# Patient Record
Sex: FEMALE | Race: WHITE | Employment: UNEMPLOYED | URBAN - METROPOLITAN AREA
[De-identification: names, ages, dates, MRNs, and addresses within clinical notes are randomized per-mention and may not be internally consistent; named-entity substitution may affect disease eponyms.]

---

## 2019-01-28 ENCOUNTER — HOSPITAL ENCOUNTER (INPATIENT)
Age: 29
LOS: 7 days | Discharge: HOME | DRG: 776 | End: 2019-02-04
Attending: PSYCHIATRY & NEUROLOGY | Admitting: PSYCHIATRY & NEUROLOGY
Payer: COMMERCIAL

## 2019-01-28 ENCOUNTER — HOSPITAL ENCOUNTER (EMERGENCY)
Age: 29
Discharge: STILL ADMITTED OR ADMINISTRATIVE | End: 2019-01-28
Attending: EMERGENCY MEDICINE
Payer: SELF-PAY

## 2019-01-28 PROCEDURE — 75810000275 HC EMERGENCY DEPT VISIT NO LEVEL OF CARE

## 2019-01-28 PROCEDURE — 74011250637 HC RX REV CODE- 250/637: Performed by: PSYCHIATRY & NEUROLOGY

## 2019-01-28 PROCEDURE — 65220000003 HC RM SEMIPRIVATE PSYCH

## 2019-01-28 RX ORDER — ESCITALOPRAM OXALATE 10 MG/1
20 TABLET ORAL DAILY
Status: DISCONTINUED | OUTPATIENT
Start: 2019-01-29 | End: 2019-02-04 | Stop reason: HOSPADM

## 2019-01-28 RX ORDER — IBUPROFEN 600 MG/1
600 TABLET ORAL
Status: DISCONTINUED | OUTPATIENT
Start: 2019-01-28 | End: 2019-02-04 | Stop reason: HOSPADM

## 2019-01-28 RX ORDER — HYDROXYZINE PAMOATE 50 MG/1
50 CAPSULE ORAL
Status: DISCONTINUED | OUTPATIENT
Start: 2019-01-28 | End: 2019-02-04 | Stop reason: HOSPADM

## 2019-01-28 RX ORDER — HALOPERIDOL 5 MG/1
5 TABLET ORAL
Status: DISCONTINUED | OUTPATIENT
Start: 2019-01-28 | End: 2019-02-01

## 2019-01-28 RX ORDER — HALOPERIDOL 5 MG/ML
5 INJECTION INTRAMUSCULAR
Status: DISCONTINUED | OUTPATIENT
Start: 2019-01-28 | End: 2019-02-01

## 2019-01-28 RX ORDER — FERROUS SULFATE, DRIED 160(50) MG
1 TABLET, EXTENDED RELEASE ORAL
Status: DISCONTINUED | OUTPATIENT
Start: 2019-01-29 | End: 2019-02-04 | Stop reason: HOSPADM

## 2019-01-28 RX ORDER — DOCUSATE SODIUM 100 MG/1
100 CAPSULE, LIQUID FILLED ORAL DAILY
Status: DISCONTINUED | OUTPATIENT
Start: 2019-01-29 | End: 2019-02-04 | Stop reason: HOSPADM

## 2019-01-28 RX ORDER — LANOLIN ALCOHOL/MO/W.PET/CERES
1 CREAM (GRAM) TOPICAL
Status: DISCONTINUED | OUTPATIENT
Start: 2019-01-29 | End: 2019-02-04 | Stop reason: HOSPADM

## 2019-01-28 RX ADMIN — HYDROXYZINE PAMOATE 50 MG: 50 CAPSULE ORAL at 19:19

## 2019-01-29 PROBLEM — F29 PSYCHOSIS (HCC): Status: ACTIVE | Noted: 2019-01-29

## 2019-01-29 PROCEDURE — 74011250637 HC RX REV CODE- 250/637: Performed by: PSYCHIATRY & NEUROLOGY

## 2019-01-29 PROCEDURE — 65220000003 HC RM SEMIPRIVATE PSYCH

## 2019-01-29 RX ORDER — OXYCODONE AND ACETAMINOPHEN 5; 325 MG/1; MG/1
1 TABLET ORAL 2 TIMES DAILY
Status: DISCONTINUED | OUTPATIENT
Start: 2019-01-29 | End: 2019-02-04 | Stop reason: HOSPADM

## 2019-01-29 RX ADMIN — HYDROXYZINE PAMOATE 50 MG: 50 CAPSULE ORAL at 02:35

## 2019-01-29 RX ADMIN — IBUPROFEN 600 MG: 600 TABLET ORAL at 17:57

## 2019-01-29 RX ADMIN — IBUPROFEN 600 MG: 600 TABLET ORAL at 08:50

## 2019-01-29 RX ADMIN — ESCITALOPRAM OXALATE 20 MG: 10 TABLET ORAL at 08:50

## 2019-01-29 RX ADMIN — FERROUS SULFATE TAB 325 MG (65 MG ELEMENTAL FE) 325 MG: 325 (65 FE) TAB at 08:49

## 2019-01-29 RX ADMIN — LURASIDONE HYDROCHLORIDE 60 MG: 40 TABLET, FILM COATED ORAL at 08:49

## 2019-01-29 RX ADMIN — HYDROXYZINE PAMOATE 50 MG: 50 CAPSULE ORAL at 23:51

## 2019-01-29 RX ADMIN — CALCIUM CARBONATE 500 MG (1,250 MG)-VITAMIN D3 200 UNIT TABLET 1 TABLET: at 08:49

## 2019-01-29 RX ADMIN — HYDROXYZINE PAMOATE 50 MG: 50 CAPSULE ORAL at 12:57

## 2019-01-29 RX ADMIN — PRENATAL VITAMINS-IRON FUMARATE 27 MG IRON-FOLIC ACID 0.8 MG TABLET 1 TABLET: at 08:50

## 2019-01-29 RX ADMIN — DOCUSATE SODIUM 100 MG: 100 CAPSULE, LIQUID FILLED ORAL at 08:50

## 2019-01-29 RX ADMIN — OXYCODONE HYDROCHLORIDE AND ACETAMINOPHEN 1 TABLET: 5; 325 TABLET ORAL at 20:20

## 2019-01-29 NOTE — BH NOTES
Patient  came to visit and brought in patients  licenses and insurance cards. Cards are in patients chart in envelope

## 2019-01-29 NOTE — BH NOTES
ERIKA Note:-S-\"I am just trying to hold on:!!-O-The above pt has been quiet but pleasant upon approach the entire shift. She has appeared very catatonic the entire shift. She has required several prompts when speaking to the above pt during a conversation. She has not experienced any falls this shift. She required several prompts to perform her rojelio-care during this shift. She has not been a management problem this shift. She has not been on the phone talking to family and friends the entire shift. She has participated in scheduled groups however when not in group she was int he bed laying down thereabout the shift. She has not experienced any falls this shift. -A-Problem #1 cont. -P-Maintain a safe and supportive enviorment.

## 2019-01-29 NOTE — PROGRESS NOTES
Problem: Depressed Mood (Adult/Pediatric) Goal: *STG: Remains safe in hospital 
Patient will remain free of harm to self and others every shift throughout hospitalization. Outcome: Progressing Towards Goal 
Pt has not engaged in any self injurious behaviors Goal: *STG: Complies with medication therapy Patient will take medication as prescribed every shift throughout hospital stay. Outcome: Progressing Towards Goal 
Pt compliant with prescribed medications Comments: Pt has been in day area. Pt has minimal interactions with peers. Pt has attended groups with participation. Pt denies current SI and AVH. Pt compliant with meals and meds. Rounds maintained Q 15 mins. Staff will continue to offer a safe and supportive environment

## 2019-01-29 NOTE — H&P
[address] [place] [address]  
[facility] ADMIT NOTE Rory Landau 
MR#: [id number] : 1990 ACCOUNT #: [de-identified] ADMIT DATE: 2019 IDENTIFYING DATA:  Patient is a 39-year-old  white female resident of [place], [place]. She is covered by [employer] [facility] [employer]. BASIS FOR ADMISSION:  The patient was admitted in referral to us after her outpatient nurse practitioner called to try to get her into the hospital.  She had been at [facility] Room on the prior evening in a confused and disorganized condition being 1 week postpartum. They had tried to get her into facilities in the local area and there were no facilities available anywhere in the [facility] at the time. She was continuing to be described as confused and disorganized, being unable to care for self. The patient reports that she had been seen by Rosalio Gill, nurse practitioner at [facility] (ROMAN HUFFMAN) since she had first been diagnosed as pregnant 9 and a half months earlier. She described difficulties with focus and confusion. She had been depressed. The patient had been placed on venlafaxine XR and had been on clonazepam 0.5 mg twice a day as needed. Patient was switched over to Lexapro 10 mg daily, which seemed to have been helping somewhat. The patient described a prior history of substance addiction including opiates and alprazolam.  She had first been placed on methadone being up to 120 mg daily for a year. She had been switched over to Suboxone, unknown dosing and then when pregnant was switched over to Subutex. She was on 2 mg daily. We were unaware of this that she was on the Subutex when case was presented and she was accepted for admission. We had not been given that information. On presentation, the patient does give history of having been doing fairly well until several months before delivery. She slowly got more depressed. She was significantly more depressed after the birth of the child. She was having episodes of confusion and disorganization, screaming to go into adjacent rooms, believing that her child was there when she was not in the other room. She would get aggressive and agitated. She was confused and disorganized. She would hear the sound of her child screaming in the other room. Currently, she denies auditory hallucinations, but does continue to break out in tears rapidly and without reason. She endorsed paranoid and persecutory ideas. She had been continuing to take her medicines. Most recently, she had been switched on to Latuda 60 mg in the morning and increased Lexapro 20 mg daily. She said she had been getting her Subutex 2 mg daily from [facility] in [place], [place]. MEDICAL HISTORY:  Significant for her front teeth having significant dental decay as a result of her substance abuse history. She describes a history of chronic pain in various parts of the body. Could not give the exact place. She described chronic low back pain, having been on painkillers for this. She did have an IUD placed after her delivery. ALLERGIES:  SHE IS ALLERGIC TO AUGMENTIN AND CIPRO. REVIEW OF SYSTEMS:  Did show that she had complaints of low back complaints and denied other physical health symptoms. Most recent physical examination had been normal other than her postpartum condition. She had been denying any other symptoms and did not exhibit any abnormalities including not exhibiting neurologic abnormalities. She did describe fairly normal vaginal bleeding postpartum. SUBSTANCE ABUSE HISTORY:  She said that she has been taking the clonazepam about 1 per day. She has tried CBD oil but said that this was not helpful. She denied alcohol or drug abuse. FAMILY HISTORY:  Significant for maternal grandfather with depression and anxiety. She is a high school graduate.   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 Denied having difficulty with diversion during that time. MENTAL STATUS EXAMINATION:  Revealed her to be an alert white female who is fully oriented. She intermittently would start crying. She was wearing maternity pants, which were apparently too large and constantly be pulling them up when she walked. Speech was fluent and soft. Mood was depressed with a congruent affect with intermittent crying. Thought processing revealed some degree of confusion and disorganization but on the whole was logical mostly and goal directed. She endorsed paranoid ideas. She denied current auditory hallucinations. There was a question that she had been hearing the voice of her child crying on the prior day, but not today. She denied homicidal or suicidal ideas. She obsessed on her child as to where her child was and to what her child was doing. IMPRESSION: 
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 AXIS II:  None. AXIS III:  One week status post uterine delivery. ASSESSMENT:  The patient is admitted for a peripartum depression with subsequent postpartum psychosis. She is currently receiving increased dose Lexapro 20 mg daily and the Latuda 60 mg daily. We will continue to increase Latuda if it appears she is not getting better. We do not have access to the Subutex at this time and we will have to place her on oxycodone 5 mg twice a day as a temporary measure. She can go back to her Subutex as an outpatient and then switch over to Suboxone after several days on this. Will continue with individual, group and milieu therapies art and recreation therapy, case management services, social work services. ESTIMATED LENGTH OF STAY:  5-7 days. ANTICIPATED DISPOSITION:  Follow up back with Ms. Zackary Snellen. PROGNOSIS:  MD VERONICA Ng/[place] 
 D: 01/29/2019 14:43 T: 01/29/2019 15:47 JOB #: [id number]

## 2019-01-29 NOTE — BH NOTES
GROUP THERAPY PROGRESS NOTE Jose Ramon Moore was encouraged by staff but refused to participate in  Community.

## 2019-01-29 NOTE — BH NOTES
Pt confused went in bedroom and put on her roommate pants, underwear , crying stating \"I don't know where I am. Staff ensured pt she was in the hospital and we will take good care of her. Pt stated \"Okay\".

## 2019-01-29 NOTE — BH NOTES
Tee Webster is  participating in Treatment Concerns Group time: 2610 Personal goal for participation: [employer] Goal orientation: community Group therapy participation: fully participated Therapeutic interventions reviewed and discussed: Staff discussed  Staff discussed the Mental Health programs offered. Unit schedule for groups,  Visiting hours, patient advocate name and phone number and where this information is posted on the unit, etc. Report any maintenance/housekeeping or treatment concerns to staff so it can be addressed by the Treatment Team. 
 
Impression of participation: Pt.did not have any maintenance/housekeeping or treatment concerns to report to staff .

## 2019-01-29 NOTE — BH NOTES
GROUP THERAPY PROGRESS NOTE Olimpia Marino is participating in Goals Group. Group time: 35 minutes Personal goal for participation: goals Goal orientation: personal 
 
Group therapy participation: active Therapeutic interventions reviewed and discussed: She was not a management problem and encouraged to take it slow to help with her stressors. Impression of participation: She was not a management problem during group she focused on \"taking it one day at a time\" during group.

## 2019-01-29 NOTE — BH NOTES
Patient observed very irritable, tearful, states \"I don't even know where I am\" Patient was reminded that she is in the hospital, she asked can I get something to help me?' Patient was offered Vistaril 50 mg PO PRN for Anxiety

## 2019-01-29 NOTE — BH NOTES
Patient crying uncontrollably. Patient asking \" Can you help me? \" Patient requesting to see her baby. Patient unable to calm. Patient given Vistaril PO.

## 2019-01-29 NOTE — BH NOTES
Pt appeared to have slept for 5.25+ hours. Pt c/o of not being able to fall back asleep; informed RN. Pt assisted with changing sanitary napkin, clothing and linen. Small amount of blood observed on pants and fitted sheet. Pt was cooperative; appears to be sleeping at this time. Will continue to monitor for safety.

## 2019-01-29 NOTE — BSMART NOTE
ACTIVITIES THERAPY PROGRESS NOTE Group time:1530 Got up when called to group and sat in day area apart from group and did not attend. Seemed possibly confused about request to attend.

## 2019-01-29 NOTE — BH NOTES
Patient cooperative with admission process, she denies suicidal ideation and contracted for safety. Patient remained tearful through admission process, verbalized that she does not know where she was. This writer explained to patient reason for admission, patient stated that she still thinks that people are following her. Patient was reassured that she is on a safe unit and will be monitored for safety, safety checks will be done every 15 minutes and as needed therefore should not be afraid when she observes that her room door opens often. Patient was offered frozen dinner, she remained in her bed for now until ready to eat. Unit rules and regulations explained, unit tour done. Patient encouraged to come to the day area to eat, safety monitoring continues.

## 2019-01-30 PROCEDURE — 74011250637 HC RX REV CODE- 250/637: Performed by: PSYCHIATRY & NEUROLOGY

## 2019-01-30 PROCEDURE — 65220000003 HC RM SEMIPRIVATE PSYCH

## 2019-01-30 RX ORDER — TRAZODONE HYDROCHLORIDE 50 MG/1
50 TABLET ORAL
Status: DISCONTINUED | OUTPATIENT
Start: 2019-01-30 | End: 2019-02-01

## 2019-01-30 RX ORDER — ONDANSETRON 4 MG/1
4 TABLET, FILM COATED ORAL
Status: DISCONTINUED | OUTPATIENT
Start: 2019-01-30 | End: 2019-01-31

## 2019-01-30 RX ADMIN — OXYCODONE HYDROCHLORIDE AND ACETAMINOPHEN 1 TABLET: 5; 325 TABLET ORAL at 08:54

## 2019-01-30 RX ADMIN — OXYCODONE HYDROCHLORIDE AND ACETAMINOPHEN 1 TABLET: 5; 325 TABLET ORAL at 20:41

## 2019-01-30 RX ADMIN — DOCUSATE SODIUM 100 MG: 100 CAPSULE, LIQUID FILLED ORAL at 08:55

## 2019-01-30 RX ADMIN — CALCIUM CARBONATE 500 MG (1,250 MG)-VITAMIN D3 200 UNIT TABLET 1 TABLET: at 08:54

## 2019-01-30 RX ADMIN — ESCITALOPRAM OXALATE 20 MG: 10 TABLET ORAL at 08:54

## 2019-01-30 RX ADMIN — FERROUS SULFATE TAB 325 MG (65 MG ELEMENTAL FE) 325 MG: 325 (65 FE) TAB at 10:43

## 2019-01-30 RX ADMIN — PRENATAL VITAMINS-IRON FUMARATE 27 MG IRON-FOLIC ACID 0.8 MG TABLET 1 TABLET: at 08:55

## 2019-01-30 RX ADMIN — LURASIDONE HYDROCHLORIDE 60 MG: 40 TABLET, FILM COATED ORAL at 08:54

## 2019-01-30 NOTE — BSMART NOTE
ART THERAPY GROUP PROGRESS NOTE PATIENT SCHEDULED FOR GROUP AT: 1330 ATTENDANCE: 1/4 PARTICIPATION LEVEL: Participates fully in the art process ATTENTION LEVEL: Able to focus on task with minimal need for re-direction FOCUS: Problem-solving SYMBOLIC & THEMATIC CONTENT AS NOTED IN IMAGERY: She was a bit distant and would stare off during group art process. She was called out the majority of group to meet with a visitor. She returned and claimed that she had a nice visit with her \"old . \"

## 2019-01-30 NOTE — PROGRESS NOTES
Problem: Psychosis Goal: *STG: Remains safe in hospital 
Patient will remain free of harm to self and others every shift throughout hospitalization. Outcome: Progressing Towards Goal 
No self injurious behaviors noted Goal: *STG/LTG: Complies with medication therapy Patient will take medication as prescribed every shift throughout hospital stay. Outcome: Progressing Towards Goal 
Patient medication compliant Comments: Patient with ongoing confusion at times. Patient asks a question and within ten minutes returns to ask the same question. Patient encouraged to complete hygiene. Patient got in shower for a short period of time. Patient assisted with feminine products for menses care. Patient needs encouragement to eat. Patient attending groups. Patient out in the dayroom interacting with peers. Rounds maintained q 15 minutes. Staff will continue to monitor for safety and provide a supportive environment.

## 2019-01-30 NOTE — BH NOTES
Treatment team met - Medical Director: __x___present Psychiatrist: __x___present Charge nurse: __x___present MSW: __x___present : _____present Nurse Manager: _____present Student RNs: _____present Medical Students: _____present Art Therapist: __x___present Clinical Coordinator: __x___present Occupational Therapist: _x___present : _______ present UR  ___x____ present Crisis Supervisor_______present Plan of care discussed and updated as appropriate. Pt was started on Oxycodone twice a day on yesterday for temporary measures because she was taking subutex.

## 2019-01-30 NOTE — BH NOTES
Patient's  brought in buprenorphine for patient use. This is her prescription from home. MD to evaluate use for this patient. Made aware via this note and report. Verbal and taped.

## 2019-01-30 NOTE — BSMART NOTE
OCCUPATIONAL THERAPY PROGRESS NOTE Group Time:  1500 Attendance: The patient attended full group. Participation: The patient participated with minimal elaboration in the activity. Attention: The patient needed frequent redirection to activity. Interaction: The patient acknowledges others or responds to questions,  with no spontaneous interaction. Unable to initially fill out her activity sheet (peer helped) and unable to keep up with activity (again, peer helped). Seemed to be slightly more able to do this end of group. Awareness minimal.  Did not respond to anything said in group.   Answers minimal.

## 2019-01-30 NOTE — PROGRESS NOTES
Behavioral Health Progress Note Admit Date: 1/28/2019 Hospital day 1 Vitals :  
Patient Vitals for the past 8 hrs: 
 BP Temp Pulse Resp  
01/30/19 0830 128/78 98.5 °F (36.9 °C) 92 18 Labs:  No results found for this or any previous visit (from the past 24 hour(s)). Meds:  
Current Facility-Administered Medications Medication Dose Route Frequency  ondansetron hcl (ZOFRAN) tablet 4 mg  4 mg Oral Q6H PRN  
 oxyCODONE-acetaminophen (PERCOCET) 5-325 mg per tablet 1 Tab  1 Tab Oral BID  
 haloperidol lactate (HALDOL) injection 5 mg  5 mg IntraMUSCular Q6H PRN  
 haloperidol (HALDOL) tablet 5 mg  5 mg Oral Q6H PRN  
 hydrOXYzine pamoate (VISTARIL) capsule 50 mg  50 mg Oral Q4H PRN  
 ibuprofen (MOTRIN) tablet 600 mg  600 mg Oral Q6H PRN  
 calcium-vitamin D (OS-ALESSIA) 500 mg-200 unit tablet  1 Tab Oral DAILY WITH BREAKFAST  docusate sodium (COLACE) capsule 100 mg  100 mg Oral DAILY  escitalopram oxalate (LEXAPRO) tablet 20 mg  20 mg Oral DAILY  ferrous sulfate tablet 325 mg  1 Tab Oral DAILY WITH BREAKFAST  lurasidone (LATUDA) tablet 60 mg  60 mg Oral DAILY WITH BREAKFAST  prenatal vit-iron fumarate-fa tablet 1 Tab  1 Tab Oral DAILY Hospital Problems: Active Problems: Psychosis ([address] (1/29/2019) Subjective:  
Medication side effects: confusion 
insomnia Had wanted to pump breast milk . Suggest not as she will not be breast feeding. Discussed w/ pt who agrees. Mild paranoia, jittery. Crying spells. No aud halluc. Missing her baby, not delusional believing child in next room now. Poor sleep. Wants Ensure b/o poor appetite. Poor night sleep Mental Status Exam 
Sensorium: alert Orientation: only aware of  place and person Relations: cooperative Eye Contact: poor Appearance: is unkempt Thought Process: slow rate of thoughts and fair abstract reasoning/computation Thought Content: no evidence of impairment Suicidal: denies Homicidal: none Mood: is depressed and is anxious Affect: labile Memory: shows no evidence of impairment Concentration: distractable Abstraction: concrete Insight: The patient's insight shows no evidence of impairment OR Fair Judgement: is psychologically impaired OR  Fair Assessment/Plan:  
improved Continue close observation,

## 2019-01-30 NOTE — BSMART NOTE
SW assessment/Intervention:  Chart reviewed and addressed in team meeting. SW engaged with patient as she attended group. Patient was attentive and addressed concerns. Patient reports she just had a baby and is not herself. Patient appeared confused at times and presented with a blank stare. Patient was encouraged to continue engaging in the milieu. Plan: SW will address discharge consulting with treating physician. LIZZETTE Alfaro,

## 2019-01-30 NOTE — BH NOTES
Pt was encouraged to wash self this morning at that time noticed she was on menses and gave her pads and underwear. She did come out for meals and seemed to come to staff asking for help but then could not state for what reason. Redirecrtion needed several times.

## 2019-01-31 PROBLEM — F32.3 SEVERE MAJOR DEPRESSION, SINGLE EPISODE, WITH PSYCHOTIC FEATURES (HCC): Status: ACTIVE | Noted: 2019-01-29

## 2019-01-31 PROCEDURE — 65220000003 HC RM SEMIPRIVATE PSYCH

## 2019-01-31 PROCEDURE — 74011250637 HC RX REV CODE- 250/637: Performed by: PSYCHIATRY & NEUROLOGY

## 2019-01-31 RX ORDER — PROMETHAZINE HYDROCHLORIDE 25 MG/1
25 TABLET ORAL
Status: DISCONTINUED | OUTPATIENT
Start: 2019-01-31 | End: 2019-02-04 | Stop reason: HOSPADM

## 2019-01-31 RX ADMIN — OXYCODONE HYDROCHLORIDE AND ACETAMINOPHEN 1 TABLET: 5; 325 TABLET ORAL at 09:13

## 2019-01-31 RX ADMIN — FERROUS SULFATE TAB 325 MG (65 MG ELEMENTAL FE) 325 MG: 325 (65 FE) TAB at 09:13

## 2019-01-31 RX ADMIN — ONDANSETRON HYDROCHLORIDE 4 MG: 4 TABLET, FILM COATED ORAL at 16:26

## 2019-01-31 RX ADMIN — LURASIDONE HYDROCHLORIDE 60 MG: 40 TABLET, FILM COATED ORAL at 09:13

## 2019-01-31 RX ADMIN — ONDANSETRON HYDROCHLORIDE 4 MG: 4 TABLET, FILM COATED ORAL at 09:12

## 2019-01-31 RX ADMIN — TRAZODONE HYDROCHLORIDE 50 MG: 50 TABLET ORAL at 21:04

## 2019-01-31 RX ADMIN — CALCIUM CARBONATE 500 MG (1,250 MG)-VITAMIN D3 200 UNIT TABLET 1 TABLET: at 09:13

## 2019-01-31 RX ADMIN — ESCITALOPRAM OXALATE 20 MG: 10 TABLET ORAL at 09:13

## 2019-01-31 RX ADMIN — IBUPROFEN 600 MG: 600 TABLET ORAL at 01:26

## 2019-01-31 RX ADMIN — IBUPROFEN 600 MG: 600 TABLET ORAL at 17:35

## 2019-01-31 RX ADMIN — PRENATAL VITAMINS-IRON FUMARATE 27 MG IRON-FOLIC ACID 0.8 MG TABLET 1 TABLET: at 09:13

## 2019-01-31 RX ADMIN — HYDROXYZINE PAMOATE 50 MG: 50 CAPSULE ORAL at 09:12

## 2019-01-31 RX ADMIN — DOCUSATE SODIUM 100 MG: 100 CAPSULE, LIQUID FILLED ORAL at 09:13

## 2019-01-31 RX ADMIN — TRAZODONE HYDROCHLORIDE 50 MG: 50 TABLET ORAL at 01:26

## 2019-01-31 RX ADMIN — OXYCODONE HYDROCHLORIDE AND ACETAMINOPHEN 1 TABLET: 5; 325 TABLET ORAL at 21:04

## 2019-01-31 NOTE — BH NOTES
GROUP THERAPY PROGRESS NOTE Janet Crystal is not participating in Recreational Therapy. Staff encouraged patient to participate in group, and patient refused.

## 2019-01-31 NOTE — BH NOTES
Pt encouraged to get out of bed and bathe which she minimally did. Encouraged to put clean clothing on but chose to wear gowns Given pads and underwear with continuation of being on menses. Tearful when approached about her behavior and wanted staff to sit with her who was unable to at that time. Came out with much encouraging to interact with peers although stated she was afraid to come out but stayed for a long time. In room this afternoon napping. Contnue rounding for safety and redirection as needed.

## 2019-01-31 NOTE — BH NOTES
Pt appears to not be able to care for herself when it came to bathing. Staff assisted pt with bathing and changing clothes. Pt was alert when her  visit her. Pt has been  calm and cooperative in the milieu interacting with staff and peers. Pt. denies SI,HI and AVH today. Pt ate 100% of dinner and snack. Pt contracts for safety on the unit. Pt.denies any new medical/pain complaints. Staff encouraged Pt. to participate in treatment plan. Pt agreed. Pt. remain free of falls and provided non skid socks. Staff will continue to monitor Pt. for behavior safety and location.

## 2019-01-31 NOTE — BSMART NOTE
ACTIVITIES THERAPY PROGRESS NOTE Group time:2316 The patient did not awaken/get up when called for group.

## 2019-01-31 NOTE — PROGRESS NOTES
Behavioral Health Progress Note Admit Date: 1/28/2019 Hospital day 2 Vitals : No data found. Labs:  No results found for this or any previous visit (from the past 24 hour(s)). Meds:  
Current Facility-Administered Medications Medication Dose Route Frequency  promethazine (PHENERGAN) tablet 25 mg  25 mg Oral Q6H PRN  
 traZODone (DESYREL) tablet 50 mg  50 mg Oral QHS PRN  
 oxyCODONE-acetaminophen (PERCOCET) 5-325 mg per tablet 1 Tab  1 Tab Oral BID  
 haloperidol lactate (HALDOL) injection 5 mg  5 mg IntraMUSCular Q6H PRN  
 haloperidol (HALDOL) tablet 5 mg  5 mg Oral Q6H PRN  
 hydrOXYzine pamoate (VISTARIL) capsule 50 mg  50 mg Oral Q4H PRN  
 ibuprofen (MOTRIN) tablet 600 mg  600 mg Oral Q6H PRN  
 calcium-vitamin D (OS-ALESSIA) 500 mg-200 unit tablet  1 Tab Oral DAILY WITH BREAKFAST  docusate sodium (COLACE) capsule 100 mg  100 mg Oral DAILY  escitalopram oxalate (LEXAPRO) tablet 20 mg  20 mg Oral DAILY  ferrous sulfate tablet 325 mg  1 Tab Oral DAILY WITH BREAKFAST  lurasidone (LATUDA) tablet 60 mg  60 mg Oral DAILY WITH BREAKFAST  prenatal vit-iron fumarate-fa tablet 1 Tab  1 Tab Oral DAILY Hospital Problems: Principal Problem: 
  Severe major depression, single episode, with psychotic features ([facility] [address] (1/29/2019) Subjective:  
Medication side effects: nausea 
none During daytime, acts hopeless and helpless and a little less so in evening when  comes. Flo Lara, says barb ua. Now wants Phenergan for nausea and vomiting post-partum. Wrote for this. Will observe. Room was a mess w/ her clothes scattered on floor. Says too frightened of peers to attend groups. Says noise of others may make her want to kill someone or herself. Mental Status Exam 
Sensorium: alert Orientation: only aware of  time, place and person Relations: guarded and passive Eye Contact: poor Appearance: is unkempt Thought Process: slow rate of thoughts and fair abstract reasoning/computation Thought Content: paranoia and obsessions Suicidal: denies Homicidal: none Mood: is depressed and is anxious Affect: stable Memory: is impaired, is recent and is remote Concentration: distractable Abstraction: concrete Insight: The patient shows little insight OR Fair Judgement: is psychologically impaired OR  Fair Assessment/Plan:  
not changed Continue close observation, continue Latuda fpr psychosis, Lexapro for depression.

## 2019-01-31 NOTE — BH NOTES
GROUP THERAPY PROGRESS NOTE Man Elena is participating in [place]. Group time: 30 minutes Personal goal for participation: discuss guideline compliance, unit issues and community announcements Goal orientation: community Group therapy participation: minimal

## 2019-01-31 NOTE — BSMART NOTE
ART THERAPY GROUP PROGRESS NOTE Group time:1330 The patient did not awaken/get up when called for group.

## 2019-01-31 NOTE — BH NOTES
Pt observed sitting in day area. She reports a headache =6. She appears to have a blunted affect with some slowing of her verbal responses. When asked about her menses she stated , \" I just had a baby\". She was given motrin 600mg. Po and trazadone 50 mg. Po . Review of chart indicates that pt. Was started on oxycodone to bridge opiate medication.

## 2019-01-31 NOTE — BH NOTES
Warren Garcia is not participating in Recreational Therapy. Group time: 5419 Personal goal for participation: fresh air break Goal orientation: social 
 
Group therapy participation: refuse Therapeutic interventions reviewed and discussed: Staff encouraged Pt. To participate in group Impression of participation: Pt refuse, chose to rest in bed despite staff encouragement

## 2019-02-01 PROCEDURE — 65220000003 HC RM SEMIPRIVATE PSYCH

## 2019-02-01 PROCEDURE — 74011250637 HC RX REV CODE- 250/637: Performed by: PSYCHIATRY & NEUROLOGY

## 2019-02-01 RX ORDER — CLONIDINE HYDROCHLORIDE 0.1 MG/1
0.1 TABLET ORAL 2 TIMES DAILY
Status: DISCONTINUED | OUTPATIENT
Start: 2019-02-01 | End: 2019-02-04 | Stop reason: HOSPADM

## 2019-02-01 RX ORDER — TRAZODONE HYDROCHLORIDE 100 MG/1
100 TABLET ORAL
Status: DISCONTINUED | OUTPATIENT
Start: 2019-02-01 | End: 2019-02-04 | Stop reason: HOSPADM

## 2019-02-01 RX ADMIN — ESCITALOPRAM OXALATE 20 MG: 10 TABLET ORAL at 08:29

## 2019-02-01 RX ADMIN — CLONIDINE HYDROCHLORIDE 0.1 MG: 0.1 TABLET ORAL at 20:19

## 2019-02-01 RX ADMIN — OXYCODONE HYDROCHLORIDE AND ACETAMINOPHEN 1 TABLET: 5; 325 TABLET ORAL at 08:29

## 2019-02-01 RX ADMIN — LURASIDONE HYDROCHLORIDE 60 MG: 40 TABLET, FILM COATED ORAL at 08:29

## 2019-02-01 RX ADMIN — OXYCODONE HYDROCHLORIDE AND ACETAMINOPHEN 1 TABLET: 5; 325 TABLET ORAL at 20:19

## 2019-02-01 RX ADMIN — CALCIUM CARBONATE 500 MG (1,250 MG)-VITAMIN D3 200 UNIT TABLET 1 TABLET: at 08:29

## 2019-02-01 RX ADMIN — FERROUS SULFATE TAB 325 MG (65 MG ELEMENTAL FE) 325 MG: 325 (65 FE) TAB at 08:29

## 2019-02-01 RX ADMIN — PROMETHAZINE HYDROCHLORIDE 25 MG: 25 TABLET ORAL at 10:51

## 2019-02-01 RX ADMIN — TRAZODONE HYDROCHLORIDE 100 MG: 100 TABLET ORAL at 20:19

## 2019-02-01 RX ADMIN — IBUPROFEN 600 MG: 600 TABLET ORAL at 08:45

## 2019-02-01 RX ADMIN — PRENATAL VITAMINS-IRON FUMARATE 27 MG IRON-FOLIC ACID 0.8 MG TABLET 1 TABLET: at 08:29

## 2019-02-01 RX ADMIN — HYDROXYZINE PAMOATE 50 MG: 50 CAPSULE ORAL at 08:31

## 2019-02-01 NOTE — BH NOTES
GROUP THERAPY PROGRESS NOTE Nehemias Marr is participating in [employer] and [employer] Group Group time: 0746-0585 Personal goal for participation:  How to know When to Seek Treatment for Alcoholism                                                        and Anyone Experiencing Emotional Difficulties. Goal orientation: active Group therapy participation: fully participated Therapeutic interventions reviewed and discussed: When people talk about what is going on in their lives it allows them to release some of their pent up stress. To gain knowledge and support from others who have had or are currently experiencing similar issues. Impression of participation:  [facility] the [employer] reviewed a film, then  discussed the importance of sharing at [facility], help yourself out of depression, leave anxiety behind, and controlling your fears. Pt. received information  for both programs and shared while in group

## 2019-02-01 NOTE — BH NOTES
Pt narcotic medications from home were taken to pharmacy. Stub from pharmacy was put in pt file and appropriate notation was made on pt belongings sheet.

## 2019-02-01 NOTE — BH NOTES
Patient was reminded several times throughout the shift to keep two robes on tied in the front and back to keep her body covered. Staff assisted her with tying them multiple times. Patient was very social with her peers and active during [employer] [address]. Patient appeared to have the ability to interact with others, participate in group, eat her meal but when it came to putting her diaper on or maintain her hygiene she stated she didn't know how to do it. Staff talked her through the entire process of toileting and assisted her with taping her diaper on the side. Patient was given disposable underwear to place on top of diaper. Staff observed patient's tone become helpless when it was time for hygiene, but throughout the shift her tone was normal. Patient utilized the toilet twice this shift, but did not wipe herself evidence of no toilet paper in the toilet on both occasions. Staff encouraged her to take a shower this evening and she gathered everything she needed, but did not follow through with it, and chose to lay back down.

## 2019-02-01 NOTE — PROGRESS NOTES
Behavioral Health Progress Note Admit Date: 1/28/2019 Hospital day 3 Vitals :  
Patient Vitals for the past 8 hrs: 
 BP Temp Pulse Resp  
02/01/19 0815 (!) 162/106 97 °F (36.1 °C) 74 16 Repeat /97 at 1500 Labs:  No results found for this or any previous visit (from the past 24 hour(s)). Meds:  
Current Facility-Administered Medications Medication Dose Route Frequency  promethazine (PHENERGAN) tablet 25 mg  25 mg Oral Q6H PRN  
 traZODone (DESYREL) tablet 50 mg  50 mg Oral QHS PRN  
 oxyCODONE-acetaminophen (PERCOCET) 5-325 mg per tablet 1 Tab  1 Tab Oral BID  
 haloperidol lactate (HALDOL) injection 5 mg  5 mg IntraMUSCular Q6H PRN  
 haloperidol (HALDOL) tablet 5 mg  5 mg Oral Q6H PRN  
 hydrOXYzine pamoate (VISTARIL) capsule 50 mg  50 mg Oral Q4H PRN  
 ibuprofen (MOTRIN) tablet 600 mg  600 mg Oral Q6H PRN  
 calcium-vitamin D (OS-ALESSIA) 500 mg-200 unit tablet  1 Tab Oral DAILY WITH BREAKFAST  docusate sodium (COLACE) capsule 100 mg  100 mg Oral DAILY  escitalopram oxalate (LEXAPRO) tablet 20 mg  20 mg Oral DAILY  ferrous sulfate tablet 325 mg  1 Tab Oral DAILY WITH BREAKFAST  lurasidone (LATUDA) tablet 60 mg  60 mg Oral DAILY WITH BREAKFAST  prenatal vit-iron fumarate-fa tablet 1 Tab  1 Tab Oral DAILY Hospital Problems: Principal Problem: 
  Severe major depression, single episode, with psychotic features ([facility] [address] (1/29/2019) Subjective:  
Medication side effects: none 
dry mouth Mental Status Exam 
Sensorium: alert Orientation: only aware of  time, place and person Relations: cooperative Eye Contact: appropriate Appearance: shows no evidence of impairment Thought Process: normal rate of thoughts and fair abstract reasoning/computation Thought Content: anxious, mildly paranoid, afraid to be alone even to go [facility] Suicidal: denies Homicidal: none Mood: is anxious and is sad Affect: stable Memory: shows no evidence of impairment Concentration: distractable Abstraction: concrete Insight: The patient shows little insight OR Fair Judgement: is psychologically impaired OR  Fair Assessment/Plan:  
improved Continue close observation, add clonidine 0.1 mg bid for elevated BP. Hold if systiolic BP <178. Increase Trazodone for sleep to 100 mg. Continue other meds.

## 2019-02-01 NOTE — BSMART NOTE
OCCUPATIONAL THERAPY PROGRESS NOTE Group Time:  1424 Attendance: The patient attended full group. Vesna Gates Participation: The patient participated with moderate elaboration in the activity. Attention: The patient was able to focus on the activity. Interaction: The patient occasionally  interacts with others. Seemed more alert and reality oriented today. Still having some difficulty with specific answers, possibly  Disorganization or thought blocking.

## 2019-02-01 NOTE — BH NOTES
GROUP THERAPY PROGRESS NOTE Man Elena is participating in Self-Esteem. 
  
Group time: 30 Minutes. 
  
Personal goal for participation: Preparing and moving forward of having a healthy self-esteem. 
  
Goal orientation: Social 
  
Group therapy participation: Active 
  Therapeutic interventions reviewed and discussed: What does self-esteem to you and getting and having a healthy self-esteem, plus how 25 self-esteem activities and challenges help rewire your (a person's mind) mind and help increase your daily positivity one-step at a time. 
  
Impression of participation: Patient Has Fully Participated.

## 2019-02-01 NOTE — PROGRESS NOTES
Problem: Falls - Risk of 
Goal: *Absence of Falls Patient will remain free of falls every shift throughout hospital stay. Patient will verbalize understanding regarding safety and fall prevention measures to be utilized every shift throughout hospital stay. Document Richelle Primes Fall Risk and appropriate interventions in the flowsheet. Outcome: Progressing Towards Goal 
Fall Risk Interventions: 
  
 
  
 
Medication Interventions: Teach patient to arise slowly Pt has been free from falls today Problem: Depressed Mood (Adult/Pediatric) Goal: *STG: Attends activities and groups Patient will attend at least 50% or 2 of 4 groups daily throughout hospital stay. Outcome: Progressing Towards Goal 
Pt did attend 2 groups today Goal: *STG: Complies with medication therapy Patient will take medication as prescribed every shift throughout hospital stay. Outcome: Progressing Towards Goal 
Pt was medication compliant today Comments: Pt presents flat/depressed. Pt stated that she feels \"down\". When observed interacting with peers in the milieu, pt is smiling and engaging with her peers and appears not as depressed as she states. Pt stated that her appetite is \"not good\", pt was encouraged to eat a minimum of 50% of her meals to ensure adequate nutritional intake. Pt was medication and group compliant. Pt denies si/hi and avh. Pt was encouraged to continue participating in her treatment plan. No behavioral disturbances were observed. Pt was free from falls. Will continue to monitor pt for safety and compliance with treatment objectives.

## 2019-02-01 NOTE — BH NOTES
GROUP THERAPY PROGRESS NOTE Olimpia Marino is participating in Leisure-Creative Group.  
  
Group time: 30 minutes 
  
Personal goal for participation: Stress reducing activities 
  
Goal orientation: relaxation 
  
Group therapy participation: active 
  Therapeutic interventions reviewed and discussed: Healthy techniques to manage stress 
  
Impression of participation: Patient fully participated in group, and socialized with others appropriately.

## 2019-02-01 NOTE — PROGRESS NOTES
Problem: Psychosis Goal: *STG: Decreased delusional thinking Patient will show a decrease or be free of delusional statements assessed every shift throughout hospital stay. Outcome: Progressing Towards Goal 
Pt still having paranoid delusions. Goal: *STG: Remains safe in hospital 
Patient will remain free of harm to self and others every shift throughout hospitalization. Outcome: Progressing Towards Goal 
Pt denies SI/HI. Goal: *STG/LTG: Complies with medication therapy Patient will take medication as prescribed every shift throughout hospital stay. Outcome: Progressing Towards Goal 
Pt is medication compliant. Comments: Patient cooperative and calm. She is easily distracted, thought blocking at times, and is paranoid. She states that she is afraid to go into the bathroom because she thinks she won't be able to get out. She requires redirection to provide self care. Her affect is dull and appears disconnected when I engage in conversations about her child. She is medication complaint. She is able to make needs known. She reports depression but deinies SI/HI.

## 2019-02-02 PROCEDURE — 74011250637 HC RX REV CODE- 250/637: Performed by: PSYCHIATRY & NEUROLOGY

## 2019-02-02 PROCEDURE — 65220000003 HC RM SEMIPRIVATE PSYCH

## 2019-02-02 RX ADMIN — OXYCODONE HYDROCHLORIDE AND ACETAMINOPHEN 1 TABLET: 5; 325 TABLET ORAL at 08:06

## 2019-02-02 RX ADMIN — CALCIUM CARBONATE 500 MG (1,250 MG)-VITAMIN D3 200 UNIT TABLET 1 TABLET: at 08:06

## 2019-02-02 RX ADMIN — DOCUSATE SODIUM 100 MG: 100 CAPSULE, LIQUID FILLED ORAL at 08:05

## 2019-02-02 RX ADMIN — LURASIDONE HYDROCHLORIDE 60 MG: 40 TABLET, FILM COATED ORAL at 08:05

## 2019-02-02 RX ADMIN — ESCITALOPRAM OXALATE 20 MG: 10 TABLET ORAL at 08:05

## 2019-02-02 RX ADMIN — PRENATAL VITAMINS-IRON FUMARATE 27 MG IRON-FOLIC ACID 0.8 MG TABLET 1 TABLET: at 08:05

## 2019-02-02 RX ADMIN — IBUPROFEN 600 MG: 600 TABLET ORAL at 17:42

## 2019-02-02 RX ADMIN — HYDROXYZINE PAMOATE 50 MG: 50 CAPSULE ORAL at 17:42

## 2019-02-02 RX ADMIN — FERROUS SULFATE TAB 325 MG (65 MG ELEMENTAL FE) 325 MG: 325 (65 FE) TAB at 08:06

## 2019-02-02 RX ADMIN — OXYCODONE HYDROCHLORIDE AND ACETAMINOPHEN 1 TABLET: 5; 325 TABLET ORAL at 20:36

## 2019-02-02 RX ADMIN — CLONIDINE HYDROCHLORIDE 0.1 MG: 0.1 TABLET ORAL at 20:36

## 2019-02-02 NOTE — BH NOTES
Patient appeared to be in a good mood throughout this shift. She was observed smiling and talking with her peers. Patient ate about 50% of her dinner and snacks. She talked about her baby today with staff and her peers. Staff encouraged her to take a shower this evening, and patient stated she would. Staff did not observe patient managing hygiene this shift. Patient had visitors this evening, and was showing her peers printed pictures of her baby. Patient appeared to remain positive. No behavior changes to report. Staff will continue to monitor patient for safety.

## 2019-02-02 NOTE — BH NOTES
GROUP THERAPY PROGRESS NOTE Erika Walsh is participating in [place]. Group time: 30 minutes Personal goal for participation: rules/regulations Goal orientation: community Group therapy participation: active Therapeutic interventions reviewed and discussed: She was not a management problem this shift. Impression of participation: She was alert and cooperative during group

## 2019-02-02 NOTE — BH NOTES
GROUP THERAPY PROGRESS NOTE Sagrario Benavidez is participating in Recreational Therapy. Group time: 8169 Personal goal for participation: fresh air break/games on the unit Goal orientation: social 
 
Group therapy participation: active Therapeutic interventions reviewed and discussed: Staff encouraged Pt to get off the unit and go outside and get some fresh air, or play games on the unit with peers. Impression of participation: 
 Pt. chose to stay on the unit, play games with peers, color scott patterns and watch a movie.

## 2019-02-02 NOTE — PROGRESS NOTES
Problem: Depressed Mood (Adult/Pediatric) Goal: *STG: Attends activities and groups Patient will attend at least 50% or 2 of 4 groups daily throughout hospital stay. Outcome: Progressing Towards Goal 
Attending Goal: *STG: Remains safe in hospital 
Patient will remain free of harm to self and others every shift throughout hospitalization. Outcome: Progressing Towards Goal 
Remains safe. Goal: *STG: Complies with medication therapy Patient will take medication as prescribed every shift throughout hospital stay. Outcome: Progressing Towards Goal 
Compliant. Problem: Psychosis Goal: *STG: Decreased hallucinations Patient will verbalize denial of auditory/visual hallucinations every shift throughout hospital stay. Outcome: Progressing Towards Goal 
Denies Comments: Patient is pleasant on approach. Social in day area with peers. Denies auditory hallucinations and states she is not having suicidal thoughts. States she came in the hospital because she did not feel safe at home with her baby. She states that paranoid feeling is gone. Will continue to monitor and maintain a safe environment.

## 2019-02-02 NOTE — PROGRESS NOTES
[address] [facility] Physician Daily Progress Note Patient:  Warren Garcia Age:  26 y.o. :  1990 SEX:  female MRN:  [id number] CSN:  [id number] Admit Date:  2019 DSM 5 Diagnosis Patient Active Problem List  
Diagnosis Code  Severe major depression, single [facility], with [facility] features [facility] F32.3 Subjective:  
76-year-old  white female with h/o postpartum depression. She is tearful, states she feels frustrated that she cannot \" do anything for her\". She is on Norma. Current Medications:   
Current Facility-Administered Medications Medication Dose Route Frequency Provider Last Rate Last Dose  cloNIDine HCl (CATAPRES) tablet 0.1 mg  0.1 mg Oral BID Sun Shaikh MD   Stopped at 19 0800  
 traZODone (DESYREL) tablet 100 mg  100 mg Oral QHS PRN Sun Shaikh MD   100 mg at 19 2019  promethazine (PHENERGAN) tablet 25 mg  25 mg Oral Q6H PRN Sun Shaikh MD   25 mg at 19 1051  oxyCODONE-acetaminophen (PERCOCET) 5-325 mg per tablet 1 Tab  1 Tab Oral BID Sun Shaikh MD   1 Tab at 19 1419  hydrOXYzine pamoate (VISTARIL) capsule 50 mg  50 mg Oral Q4H PRN Kenzie Gutierrez MD   50 mg at 19 1742  ibuprofen (MOTRIN) tablet 600 mg  600 mg Oral Q6H PRN Kenzie Gutierrez MD   600 mg at 19 1742  calcium-vitamin D (OS-ALESSIA) 500 mg-200 unit tablet  1 Tab Oral DAILY WITH BREAKFAST Isidra Martinez MD   1 Tab at 19 6251  docusate sodium (COLACE) capsule 100 mg  100 mg Oral DAILY Kenzie Benz MD   100 mg at 19 3608  escitalopram oxalate (LEXAPRO) tablet 20 mg  20 mg Oral DAILY Kenzie Benz MD   20 mg at 19 6658  ferrous sulfate tablet 325 mg  1 Tab Oral DAILY WITH BREAKFAST Kenzie Benz MD   325 mg at 19 6386  lurasidone (LATUDA) tablet 60 mg  60 mg Oral DAILY WITH BREAKFAST Bacak, Kenzie ARRIETA MD   60 mg at 02/02/19 7987  prenatal vit-iron fumarate-fa tablet 1 Tab  1 Tab Oral DAILY Peggy Sanhcez MD   1 Tab at 02/02/19 5338 Compliant with medication:  Yes Side effects from medications:  No  
 
Mental Status Exam 
 
 
Appearance   
General Behavior   Tearful   
Speech form and content, 
Language Associations Form of Thought   Normal flow and volume TP : Logical, goal oriented Mood, Affect Self-Attitude Vital Sense SI/HI/PDW   Depressed No SI, HI,  Endorses hopelessness Abnormal Perceptions and illusions   Denies    
Delusions   None Anxiety    Denies COGNITION Intelligence Abstraction   Intact Judgement Insight   Limited Medical:  
 
Visit Vitals /87 (BP 1 Location: Right arm, BP Patient Position: At rest) Pulse 88 Temp 97.2 °F (36.2 °C) Resp 18 Ht 5' 3\" (1.6 m) BMI 28.34 kg/m² No results found for this or any previous visit (from the past 24 hour(s)). Recommendation and Plan Treatment Plan 1. Continue current treatment modalities? If no, state rationale and address changes in Treatment Plan under Optional Sections. Yes 2. Continue current medications? If no, state rationale and address changes in Medications under Optional Sections. Yes 3. Referrals or Consultations needed? Specify below and state reason. No 
4. Discharge Planning: Needs stabilization I certify that this patient's inpatient psychiatric hospital services furnished since the previous certification were, and continue to be, required for treatment that could reasonably be expected to improve the patient's condition, or for diagnostic study, and that the patient continues to need, on a daily basis, active treatment furnished directly by or requiring the supervision of inpatient psychiatric facility personnel. In addition the hospital records show that services furnished were intensive treatment services, admission or related services, or equivalent services. Signed By: Grace Murphy MD   
 2/2/2019

## 2019-02-03 PROCEDURE — 74011250637 HC RX REV CODE- 250/637: Performed by: PSYCHIATRY & NEUROLOGY

## 2019-02-03 PROCEDURE — 65220000003 HC RM SEMIPRIVATE PSYCH

## 2019-02-03 RX ADMIN — CALCIUM CARBONATE 500 MG (1,250 MG)-VITAMIN D3 200 UNIT TABLET 1 TABLET: at 08:30

## 2019-02-03 RX ADMIN — CLONIDINE HYDROCHLORIDE 0.1 MG: 0.1 TABLET ORAL at 08:29

## 2019-02-03 RX ADMIN — PRENATAL VITAMINS-IRON FUMARATE 27 MG IRON-FOLIC ACID 0.8 MG TABLET 1 TABLET: at 08:29

## 2019-02-03 RX ADMIN — OXYCODONE HYDROCHLORIDE AND ACETAMINOPHEN 1 TABLET: 5; 325 TABLET ORAL at 20:40

## 2019-02-03 RX ADMIN — FERROUS SULFATE TAB 325 MG (65 MG ELEMENTAL FE) 325 MG: 325 (65 FE) TAB at 08:30

## 2019-02-03 RX ADMIN — CLONIDINE HYDROCHLORIDE 0.1 MG: 0.1 TABLET ORAL at 20:40

## 2019-02-03 RX ADMIN — LURASIDONE HYDROCHLORIDE 60 MG: 40 TABLET, FILM COATED ORAL at 08:29

## 2019-02-03 RX ADMIN — DOCUSATE SODIUM 100 MG: 100 CAPSULE, LIQUID FILLED ORAL at 08:30

## 2019-02-03 RX ADMIN — ESCITALOPRAM OXALATE 20 MG: 10 TABLET ORAL at 08:29

## 2019-02-03 RX ADMIN — OXYCODONE HYDROCHLORIDE AND ACETAMINOPHEN 1 TABLET: 5; 325 TABLET ORAL at 08:29

## 2019-02-03 NOTE — BH NOTES
Viviana Avitiabury is not participating in Leisure Activity Group. Group time: 0122 Personal goal for participation:  Decrease Anxiety Goal orientation: passive Group therapy participation: refused Therapeutic interventions reviewed and discussed:  Staff encouraged to  choose relaxation activities to decrease anxiety while interacting with peers Impression of participation:  Pt.  refuse chose to rest in bed despite staff encouragement.

## 2019-02-03 NOTE — PROGRESS NOTES
[address] [facility] Physician Daily Progress Note Patient:  Lyric Chi Age:  26 y.o. :  1990 SEX:  female MRN:  [id number] [facility]:  [id number] Admit Date:  2019 DSM 5 Diagnosis Patient Active Problem List  
Diagnosis Code  Severe major depression, single episode, with psychotic features ([facility] [address] F32.3 Subjective:  
30-year-old  white female with h/o postpartum depression. She states that her mood is slightly better. She is not as tearful today. She is on Norma. She denies any SI, HI today. Current Medications:   
Current Facility-Administered Medications Medication Dose Route Frequency Provider Last Rate Last Dose  cloNIDine HCl (CATAPRES) tablet 0.1 mg  0.1 mg Oral BID Catalino Davis MD   0.1 mg at 19 4726  
 traZODone (DESYREL) tablet 100 mg  100 mg Oral QHS PRN Catalino Davis MD   100 mg at 19 2019  promethazine (PHENERGAN) tablet 25 mg  25 mg Oral Q6H PRN Catalino Davis MD   25 mg at 19 1051  oxyCODONE-acetaminophen (PERCOCET) 5-325 mg per tablet 1 Tab  1 Tab Oral BID Catalino Davis MD   1 Tab at 19 9935  hydrOXYzine pamoate (VISTARIL) capsule 50 mg  50 mg Oral Q4H PRN Kenzie Banegas MD   50 mg at 19 1742  ibuprofen (MOTRIN) tablet 600 mg  600 mg Oral Q6H PRN Kenzie Banegas MD   600 mg at 19 1742  calcium-vitamin D (OS-ALESSIA) 500 mg-200 unit tablet  1 Tab Oral DAILY WITH BREAKFAST Kenzie Benz MD   1 Tab at 19 0830  
 docusate sodium (COLACE) capsule 100 mg  100 mg Oral DAILY Kenzie Benz MD   100 mg at 19 0830  escitalopram oxalate (LEXAPRO) tablet 20 mg  20 mg Oral DAILY Kenzie Benz MD   20 mg at 19 9872  ferrous sulfate tablet 325 mg  1 Tab Oral DAILY WITH BREAKFAST Kenzie Benz MD   325 mg at 19 0830  lurasidone (LATUDA) tablet 60 mg  60 mg Oral DAILY WITH BREAKFAST Kenzie Ochoa MD   60 mg at 02/03/19 0829  
 prenatal vit-iron fumarate-fa tablet 1 Tab  1 Tab Oral DAILY John Baeza MD   1 Tab at 02/03/19 3217 Compliant with medication:  Yes Side effects from medications:  No  
 
Mental Status Exam 
 
Appearance   
General Behavior   Tearful   
Speech form and content, 
Language Associations Form of Thought   Normal flow and volume TP : Logical, goal oriented Mood, Affect Self-Attitude Vital Sense SI/HI/PDW   Depressed No SI, HI,  Endorses hopelessness Abnormal Perceptions and illusions   Denies    
Delusions   None Anxiety    Denies COGNITION Intelligence Abstraction   Intact Judgement Insight   Limited Medical:  
 
Visit Vitals BP (!) 130/97 (BP 1 Location: Right arm, BP Patient Position: At rest) Comment: nurse notify Pulse 93 Temp 97.3 °F (36.3 °C) Resp 18 Ht 5' 3\" (1.6 m) BMI 28.34 kg/m² No results found for this or any previous visit (from the past 24 hour(s)). Recommendation and Plan Treatment Plan 1. Continue current treatment modalities? If no, state rationale and address changes in Treatment Plan under Optional Sections. Yes 2. Continue current medications? If no, state rationale and address changes in Medications under Optional Sections. Yes 3. Referrals or Consultations needed? Specify below and state reason. No 
4. Discharge Planning: needs stabilization I certify that this patient's inpatient psychiatric hospital services furnished since the previous certification were, and continue to be, required for treatment that could reasonably be expected to improve the patient's condition, or for diagnostic study, and that the patient continues to need, on a daily basis, active treatment furnished directly by or requiring the supervision of inpatient psychiatric facility personnel. In addition the hospital records show that services furnished were intensive treatment services, admission or related services, or equivalent services. Signed By: Gibran Calvillo MD   
 2/3/2019

## 2019-02-03 NOTE — BH NOTES
Patient was isolative to room. She was encouraged to go to milieu. She was noted watching television with peers. She denied suicidal/homicidal ideations. She stated she is here \"Because of postpartum depression\". She stated she experienced postpartum depression with her first pregnancy. She was encouraged to attend groups and participate. She ate dinner, snack and medication compliant. Nursing will continue to provide a safe and supportive environment.

## 2019-02-03 NOTE — BH NOTES
GROUP THERAPY PROGRESS NOTE Elbert Clement is participating in [place]. Group time: 20 minutes Personal goal for participation: rules/regulations Goal orientation: community Group therapy participation: minimal 
 
Therapeutic interventions reviewed and discussed: She was not a management problem during group. Impression of participation: She was aware of the discharge planning and rules of the unit.

## 2019-02-03 NOTE — BH NOTES
ERIKA Note: The above pt has had a visit from his  and and family this shift which appeared to have a good visit.

## 2019-02-03 NOTE — BH NOTES
\"I miss my baby, Im ready to  Go home and take care of my baby\", pt tearful when making the statement. Staff encouraged pt to speak to her .  Brittani Pérez has been polite and cooperative in the milieu interacting with staff and peers. Pt. denies SI,HI and AVH today. Pt contracts for safety on the unit agree to come to staff if feeling harm to self or others. Pt. denies any new medical/pain complaints. Pt. ate 100% of meals and received scheduled medications. Pt. did not have any visitors. Staff encouraged Pt. to  participate in treatment,  medication and group therapy. Pt agreed. Pt. remain free of falls and provided non skid socks. Staff will continue to monitor Pt. for behavior safety and location.

## 2019-02-03 NOTE — PROGRESS NOTES
Problem: Depressed Mood (Adult/Pediatric) Goal: *STG: Remains safe in hospital 
Patient will remain free of harm to self and others every shift throughout hospitalization. Outcome: Progressing Towards Goal 
aeb no unsafe behaviors observed. Problem: Psychosis Goal: *STG: Decreased hallucinations Patient will verbalize denial of auditory/visual hallucinations every shift throughout hospital stay. Outcome: Progressing Towards Goal 
aeb patient denying auditory and visual hallucinations Comments: Patient is alert and oriented x 3. Has a flat, dull affect and is isolative. She has been up for brief periods and is guarded. Denies auditory and visual hallucinations. Denies suicidal and homicidal ideations. Staff continues to monitor for safety and provide a supportive environment.

## 2019-02-04 VITALS
HEART RATE: 93 BPM | TEMPERATURE: 97.3 F | SYSTOLIC BLOOD PRESSURE: 136 MMHG | BODY MASS INDEX: 28.34 KG/M2 | RESPIRATION RATE: 18 BRPM | DIASTOLIC BLOOD PRESSURE: 78 MMHG | HEIGHT: 63 IN

## 2019-02-04 PROCEDURE — 74011250637 HC RX REV CODE- 250/637: Performed by: PSYCHIATRY & NEUROLOGY

## 2019-02-04 RX ORDER — DOCUSATE SODIUM 100 MG/1
100 CAPSULE, LIQUID FILLED ORAL DAILY
Qty: 30 CAP | Refills: 0 | Status: SHIPPED | OUTPATIENT
Start: 2019-02-05 | End: 2019-05-06

## 2019-02-04 RX ORDER — CLONIDINE HYDROCHLORIDE 0.1 MG/1
0.1 TABLET ORAL 2 TIMES DAILY
Qty: 30 TAB | Refills: 1 | Status: SHIPPED | OUTPATIENT
Start: 2019-02-04

## 2019-02-04 RX ORDER — ESCITALOPRAM OXALATE 20 MG/1
20 TABLET ORAL DAILY
Qty: 30 TAB | Refills: 0 | Status: SHIPPED | OUTPATIENT
Start: 2019-02-05

## 2019-02-04 RX ORDER — LANOLIN ALCOHOL/MO/W.PET/CERES
325 CREAM (GRAM) TOPICAL
Qty: 30 TAB | Refills: 0 | Status: SHIPPED | OUTPATIENT
Start: 2019-02-05

## 2019-02-04 RX ADMIN — OXYCODONE HYDROCHLORIDE AND ACETAMINOPHEN 1 TABLET: 5; 325 TABLET ORAL at 08:18

## 2019-02-04 RX ADMIN — ESCITALOPRAM OXALATE 20 MG: 10 TABLET ORAL at 08:18

## 2019-02-04 RX ADMIN — DOCUSATE SODIUM 100 MG: 100 CAPSULE, LIQUID FILLED ORAL at 08:18

## 2019-02-04 RX ADMIN — IBUPROFEN 600 MG: 600 TABLET ORAL at 10:54

## 2019-02-04 RX ADMIN — CLONIDINE HYDROCHLORIDE 0.1 MG: 0.1 TABLET ORAL at 08:18

## 2019-02-04 RX ADMIN — CALCIUM CARBONATE 500 MG (1,250 MG)-VITAMIN D3 200 UNIT TABLET 1 TABLET: at 08:18

## 2019-02-04 RX ADMIN — LURASIDONE HYDROCHLORIDE 60 MG: 40 TABLET, FILM COATED ORAL at 08:18

## 2019-02-04 RX ADMIN — FERROUS SULFATE TAB 325 MG (65 MG ELEMENTAL FE) 325 MG: 325 (65 FE) TAB at 08:19

## 2019-02-04 RX ADMIN — PRENATAL VITAMINS-IRON FUMARATE 27 MG IRON-FOLIC ACID 0.8 MG TABLET 1 TABLET: at 08:18

## 2019-02-04 NOTE — DISCHARGE INSTRUCTIONS
BEHAVIORAL HEALTH NURSING DISCHARGE NOTE      The following personal items collected during your admission are returned to you:   Dental Appliance: Dental Appliances: None  Vision:    Hearing Aid:    Jewelry: Jewelry: Earrings  Clothing: Clothing: Footwear, Jacket/Coat, Pajamas, Slippers, Socks(in posseission)  Other Valuables: Other Valuables: None  Valuables sent to safe: Personal Items Sent to Safe: (none)      PATIENT INSTRUCTIONS:    Your health and safety is very important to us; we remind you to commit to safety and recovery. Should you have any thoughts of harming yourself or others please dial 911, utilize your support systems, the coping strategies you have learned as well as the [address] [place] [address] at [phone] or visit their website at https://suicidepreventionlifeline.org/    The following are some additional coping strategies that you can utilize if you start to feel anxious, angry, stressed or depressed    1. Exercise (running, walking, etc.). 2. Write (poetry, stories, journal). 3. Be with other people. 4. Watch a favorite TV show. 5. Practice Mindfulness  6. Watch a funny/favorite movie  7. Do some deep breathing  8. Take a hot shower or relaxing bath. 9. Play with a pet. 10. Help others  11. Read a good book. 12. Listen to music. 13. Try some aromatherapy (candle, lotion, room spray). 14. Meditate. 15. Paint or draw. 16. Rip paper into itty-bitty pieces. 17. Shoot hoops, kick a ball. 18. Hug a pillow or stuffed animal.  19. Dance. 20. Take up a new hobby. 21. Doon. 25. Make a list of blessings in your life. 23. Contact a hotline/ your therapist.  24. Talk to someone close to you. 25. Yoga. 32. Aleksandr Judge a friend or family member. 32. Make a list of goals for the week/month/year/5 years. The discharge information has been reviewed with the patient. The patient verbalized understanding.       Patient armband removed and shredded      ***IMPORTANT NUMBERS***        [address] [facility]        [phone]    [facility]       [phone]    Suicide Prevention     [phone]

## 2019-02-04 NOTE — DISCHARGE SUMMARY
[address] Rory Landau  MR#: [id number]  : 1990  ACCOUNT #: [de-identified]   ADMIT DATE: 2019  DISCHARGE DATE: 2019    IDENTIFYING DATA:  The patient is a 39-year-old  white female resident of [address] [place] [address] who was admitted after outpatient nurse practitioner called to get her in the hospital.  She had been seen at [address] [place] [address] Emergency Room on the prior evening in a confused and disorganized condition being 1 week postpartum. They tried to get her into a facility and there were no beds anywhere in the [facility]. She was continuing to be described as confused and disorganized and depressed. She had previously seen Rosalio Gill, nurse practitioner at [facility] ([facility]) since she was first diagnosed as pregnant. She did have problems with focus and confusion and depression at that point being on venlafaxine XR,  clonazepam 0.5 mg b.i.d. She had been switched to Lexapro 10 mg daily, which had been seen as helpful. She has a history of substance addiction including opiates and alprazolam.  She had been placed on methadone 120 mg daily for a year, then was switched to Suboxone, unknown dosing and when pregnant was switched to Subutex 2 mg daily. HOSPITAL COURSE:  The patient was admitted to the locked adult unit where she was afforded observation, evaluation and treatment. Laboratory testing was not repeated at this facility and attention is invited to laboratory testing which would have been done at [address] [place] [address].  While in the hospital, she was treated with medication of lurasidone (Latuda) 60 mg daily with breakfast, oxycodone/acetaminophen 5/325 mg 1 tablet daily, prenatal vitamins 1 tablet daily. She had been offered ferrous sulfate, which she did not wish to take because she said it upset her stomach and had been offered trazodone, which again she did not wish to take.   Mood improved in the structure of the hospital.  She said that the auditory hallucinations of hearing her baby in the next room cleared up and went away. She was denying homicidal or suicidal ideas. She was denying hallucinatory or delusional material.  Memory and cognition were intact and she was no longer confused or disorganized. She wished to be discharged home to go back and be with her family, especially to see her baby. CONDITION ON DISCHARGE:  Fair. PROGNOSIS:  Fair. 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 Opioid use disorder, severe. Benzodiazepine use disorder, moderate. AXIS II:  None. AXIS III:  One week postpartum. DISPOSITION:  Discharged to self. Follow up with own obstetrician as scheduled. Follow up with own primary care provider. Follow up with Allyson Melo nurse practitioner at [address] [place] [address]. DISCHARGE MEDICATIONS:  Prenatal vitamins 1 daily, clonidine 0.1 mg b.i.d. for hypertension, #31 refill. Docusate sodium (Colace) 100 mg 1 daily, #30, no refill. Escitalopram 20 mg daily, #30, no refill. Latuda 60 mg daily with breakfast, #30, no refill.   The patient says that she will be going back to get her Subutex 2 mg daily when she gets out of the hospital.      MD VERONICA St/RUDY  D: 02/04/2019 10:34     T: 02/04/2019 14:18  JOB #: [id number]

## 2019-02-04 NOTE — BH NOTES
GROUP THERAPY PROGRESS NOTE Warren Radha is participating in [place]. Group time: 30 minutes Personal goal for participation:Discussed Unit Guidelines Goal orientation: Expectations of Treatment and Discharge Group therapy participation: minimal 
 
Therapeutic interventions reviewed and discussed: Importance of participation in treatment and follow up after discharge with med compliance.

## 2019-02-04 NOTE — BSMART NOTE
Patient prepared for discharge. Patient expressed no major issues or concerns. Disposition complete. Gary Lucas,

## 2019-02-04 NOTE — BH NOTES
Pt has been in day area interacting well with peers and staff Pt's affect is bright. Pt denies current thoughts of SI. Pt expressing readiness for discharge. Pt compliant with meals and meds. Rounds maintained Q 15 mins. Staff will continue to offer a safe and supportive environment

## 2019-02-04 NOTE — BSMART NOTE
SOCIAL WORK GROUP THERAPY PROGRESS NOTE Group Time:  11am 
 
Group Topic:  Coping Skills    C D Issues Group Participation:   
 
Pt moderately involved during group discussion but remained attentive. Left after x20 minutes to meet with her  & nursing staff. Discussion included the process of making \"Change\" by answering questions on handout with an emphasis on strengths & weaknesses to support improving one's self esteem. Pt didn't identify any specific strengths, nor any self disclosure.

## 2019-02-04 NOTE — BH NOTES
Patient discharged to self. Patient denied suicidal or homicidal ideations. Denied audio or visual hallucinations. All personal belongings returned to patient. Discharge instructions, follow up appointments, and prescriptions explained to patient. Patient verbalized understanding. Patient escorted to exit by staff with personal belongings, prescriptions, home medications and discharge instructions in her possession.

## 2019-02-04 NOTE — PROGRESS NOTES
NUTRITION Nutrition Screen RECOMMENDATIONS / PLAN:  
 
- Add supplements: Ensure Enlive, once daily. 
- Monitor and encourage po/supplement intake. - Continue RD inpatient monitoring and evaluation. NUTRITION DIAGNOSIS & INTERVENTIONS:  
  
[x] Meals/Snacks: modified composition 
[x] Medical food supplement therapy: initiate Nutrition Diagnosis: Predicted inadequate energy intake related to appetite as evidenced by pt with variable meal intake since admission. ASSESSMENT:  
 
Pt admitted with depression. Noted poor/fair meal intake since admission per chart on 1/31 with a \"not so good\" appetite. Nursing encouraging meal intake. Plan for discharge today, but will order supplements in the event that discharge is delayed. Average intake adequate to meet patients estimated nutritional needs:   [x] Yes     [] No      [] Unable to determine at this time Diet: DIET REGULAR Food Allergies: NKFA Current Appetite:   [] Good     [x] Fair     [] Poor     [] Other: 
Appetite/meal intake prior to admission:   [] Good     [] Fair     [] Poor     [x] Other: unknown Feeding Limitations:  [] Swallowing Difficulty       [] Chewing Difficulty       [] Other Current Meal Intake: No data found. Gastrointestinal Issues:  [] Yes    [x] No; none known Skin Integrity:  WDL Pertinent Medications:  Reviewed:Los-mary beth, colace, ferrous sulfate, pernatal MVI, PRN phenergan Labs:  Reviewed Anthropometrics: 
Ht Readings from Last 1 Encounters:  
01/28/19 5' 3\" (1.6 m) There were no vitals filed for this visit. Body mass index is 28.34 kg/m². Weight History:  
Weight Metrics 1/28/2019 1/21/2016 5/7/2015 4/5/2015 12/11/2014 10/5/2014 9/30/2014 Weight - 160 lb 165 lb 165 lb 160 lb 160 lb 160 lb BMI 28.34 kg/m2 28.35 kg/m2 29.24 kg/m2 29.24 kg/m2 28.35 kg/m2 28.35 kg/m2 28.35 kg/m2 Admitting Diagnosis: F33.3 MDD w/ psychosis Psychosis ([facility] [address] Past Medical History:  
Diagnosis Date  Asthma  Deviated septum  Migraine  Other ill-defined conditions(219.89) Education Needs:        [x] None identified  [] Identified - Not appropriate at this time  []  Identified and addressed - refer to education log Learning Limitations:   [x] None identified  [] Identified Cultural, Evangelical & ethnic food preferences identified:  [x] None    [] Yes ESTIMATED NUTRITION NEEDS:  
 
4333-7274 kcal (MSJx1.2-1.3), 58-73 gm protein (0.8-1 gm/kg), 1 mL/kcal 
Based on: 73 kg       [] Actual BW      [x] UBW MONITORING & EVALUATION:  
 
Nutrition Goal(s): 1. Po intake of meals will meet >75% of patient estimated nutritional needs within the next 7 days. Outcome:   [] Met    []  Not Met   [x] New/Initial Goal 
 
Monitor:  [x] Food and beverage intake   [x] Diet order   [x] Nutrition-focused physical findings   [] Weight Previous Recommendations (for follow-up assessments only):     []   Implemented       []   Not Implemented (RD to address)   [] No Longer Appropriate   [] No Recommendation Made Discharge Planning: regular diet [x]  Participated in care planning, discharge planning, & interdisciplinary rounds as appropriate Dixie Nichole, RD Pager: [phone]

## 2019-02-04 NOTE — BH NOTES
Daniel Slade is  participating in Treatment Concerns Group time: 1700 Personal goal for participation: [employer] Goal orientation: community Group therapy participation: fully participated Therapeutic interventions reviewed and discussed: Staff discussed  Staff discussed the Mental Health programs offered. Unit schedule for groups,  Visiting hours, patient advocate name and phone number and where this information is posted on the unit, etc. Report any maintenance/housekeeping or treatment concerns to staff so it can be addressed by the Treatment Team. 
 
Impression of participation: Pt.did not have any maintenance/housekeeping or treatment concerns to report to staff .

## 2020-02-13 ENCOUNTER — OFFICE VISIT (OUTPATIENT)
Dept: SURGERY | Age: 30
End: 2020-02-13

## 2020-02-13 VITALS
HEIGHT: 62 IN | DIASTOLIC BLOOD PRESSURE: 94 MMHG | SYSTOLIC BLOOD PRESSURE: 135 MMHG | HEART RATE: 98 BPM | BODY MASS INDEX: 38.64 KG/M2 | RESPIRATION RATE: 18 BRPM | WEIGHT: 210 LBS | TEMPERATURE: 97.7 F

## 2020-02-13 DIAGNOSIS — E66.01 SEVERE OBESITY (HCC): ICD-10-CM

## 2020-02-13 DIAGNOSIS — A60.00 GENITAL HERPES SIMPLEX, UNSPECIFIED SITE: Primary | ICD-10-CM

## 2020-02-13 DIAGNOSIS — K60.2 ANAL FISSURE: ICD-10-CM

## 2020-02-13 RX ORDER — CLONAZEPAM 1 MG/1
TABLET ORAL
COMMUNITY
Start: 2018-12-31

## 2020-02-13 RX ORDER — OXYCODONE AND ACETAMINOPHEN 5; 325 MG/1; MG/1
1 TABLET ORAL
Qty: 30 TAB | Refills: 0 | Status: SHIPPED | OUTPATIENT
Start: 2020-02-13 | End: 2020-02-20

## 2020-02-13 RX ORDER — ACYCLOVIR 400 MG/1
400 TABLET ORAL
Qty: 50 TAB | Refills: 0 | Status: SHIPPED | OUTPATIENT
Start: 2020-02-13 | End: 2020-02-23

## 2020-02-13 NOTE — PATIENT INSTRUCTIONS
2 tablespoons of mineral oil for 10 days, once finished start 1 adult dose daily of Citrucel powder daily, apply cream 3 times a day, sitz bath. Acyclovir.  2 week follow up

## 2020-02-13 NOTE — PROGRESS NOTES
HPI: Damien Jolley is a 31 y.o. female presenting with chief complain of anorectal pain. This is gone on for the last 4 days. It is progressed to 10 out of 10 pain. She did notice some bulging in the perianal area. She denies fevers or chills. She has noted some drainage which is been mucus and blood. The pain is constant. She had a colonoscopy 1 year ago which she states was normal.  She has a family history of colon cancer with her father being diagnosed in his 45s. Also her father's brothers have had colon cancer. She moves her bowels twice per day. She has alternating constipation and diarrhea. She takes fiber pills regularly. She denies frequent incontinent episodes, though she has  had 2 episodes in the past.    Past Medical History:   Diagnosis Date    Asthma     Deviated septum     Migraine     Other ill-defined conditions(899.89)        Past Surgical History:   Procedure Laterality Date    ABDOMEN SURGERY PROC UNLISTED      sleeve gi surgery    HX HEENT      sinus    HX TONSIL AND ADENOIDECTOMY         Family History   Problem Relation Age of Onset    Cancer Neg Hx     Diabetes Neg Hx     Heart Disease Neg Hx     Hypertension Neg Hx     Stroke Neg Hx        Social History     Socioeconomic History    Marital status:      Spouse name: Not on file    Number of children: Not on file    Years of education: Not on file    Highest education level: Not on file   Tobacco Use    Smoking status: Never Smoker    Smokeless tobacco: Never Used   Substance and Sexual Activity    Alcohol use: Yes     Frequency: 2-4 times a month     Comment: social    Drug use: No    Sexual activity: Not Currently       Review of Systems - Review of Systems   Constitutional: Negative. HENT: Negative. Eyes: Negative. Respiratory: Negative. Cardiovascular: Negative. Gastrointestinal: Positive for abdominal pain, blood in stool, constipation, diarrhea, heartburn and nausea. Negative for melena and vomiting. Genitourinary: Negative. Musculoskeletal: Negative. Skin: Negative. Neurological: Negative. Endo/Heme/Allergies: Negative. Psychiatric/Behavioral: Negative. Outpatient Medications Marked as Taking for the 2/13/20 encounter (Office Visit) with Xander Monroe MD   Medication Sig Dispense Refill    clonazePAM (KLONOPIN) 1 mg tablet TK 1 T PO BID PRN      escitalopram oxalate (LEXAPRO) 20 mg tablet Take 1 Tab by mouth daily. 30 Tab 0    ferrous sulfate 325 mg (65 mg iron) tablet Take 1 Tab by mouth daily (with breakfast). 30 Tab 0       Allergies   Allergen Reactions    Augmentin [Amoxicillin-Pot Clavulanate] Other (comments)    Ciprofloxacin Hives       Vitals:    02/13/20 0921   BP: (!) 135/94   Pulse: 98   Resp: 18   Temp: 97.7 °F (36.5 °C)   Weight: 95.3 kg (210 lb)   Height: 5' 2\" (1.575 m)   PainSc:  10 - Worst pain ever   PainLoc: Rectum       Physical Exam  Constitutional:       Appearance: She is well-developed. HENT:      Head: Normocephalic and atraumatic. Eyes:      Conjunctiva/sclera: Conjunctivae normal.   Abdominal:      General: There is no distension. Palpations: Abdomen is soft. Tenderness: There is no abdominal tenderness. Musculoskeletal: Normal range of motion. Lymphadenopathy:      Cervical: No cervical adenopathy. Skin:     General: Skin is warm and dry. Findings: No rash. Neurological:      Sensory: No sensory deficit. Psychiatric:         Speech: Speech normal.     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 All questions answered.   Plan of care agreed to by all concerned.

## 2020-02-13 NOTE — PROGRESS NOTES
Rd Cheney is a 31 y.o. female. SUBJECTIVE:  Pt went to [facility] urgent care  On 2- for rectal abscess  .